# Patient Record
Sex: MALE | Race: WHITE | NOT HISPANIC OR LATINO | Employment: UNEMPLOYED | URBAN - METROPOLITAN AREA
[De-identification: names, ages, dates, MRNs, and addresses within clinical notes are randomized per-mention and may not be internally consistent; named-entity substitution may affect disease eponyms.]

---
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
## 2024-11-13 ENCOUNTER — TELEPHONE (OUTPATIENT)
Dept: PEDIATRICS CLINIC | Facility: CLINIC | Age: 19
End: 2024-11-13

## 2024-11-25 NOTE — TELEPHONE ENCOUNTER
11/24/24 11:12 PM        The office's request has been received, reviewed, and the patient chart updated. The PCP has successfully been removed with a patient attribution note. This message will now be completed.        Thank you  Kusum El